# Patient Record
Sex: FEMALE | Race: WHITE | Employment: OTHER | URBAN - METROPOLITAN AREA
[De-identification: names, ages, dates, MRNs, and addresses within clinical notes are randomized per-mention and may not be internally consistent; named-entity substitution may affect disease eponyms.]

---

## 2019-11-04 ENCOUNTER — OFFICE VISIT (OUTPATIENT)
Dept: OTOLARYNGOLOGY | Facility: CLINIC | Age: 79
End: 2019-11-04
Payer: MEDICARE

## 2019-11-04 ENCOUNTER — OFFICE VISIT (OUTPATIENT)
Dept: AUDIOLOGY | Facility: CLINIC | Age: 79
End: 2019-11-04
Payer: MEDICARE

## 2019-11-04 VITALS
OXYGEN SATURATION: 95 % | RESPIRATION RATE: 12 BRPM | DIASTOLIC BLOOD PRESSURE: 79 MMHG | SYSTOLIC BLOOD PRESSURE: 173 MMHG | HEART RATE: 70 BPM

## 2019-11-04 DIAGNOSIS — H90.3 SENSORINEURAL HEARING LOSS (SNHL) OF BOTH EARS: ICD-10-CM

## 2019-11-04 DIAGNOSIS — H93.13 TINNITUS, BILATERAL: ICD-10-CM

## 2019-11-04 DIAGNOSIS — H90.3 SENSORINEURAL HEARING LOSS, BILATERAL: Primary | ICD-10-CM

## 2019-11-04 DIAGNOSIS — H81.11 BENIGN PAROXYSMAL POSITIONAL VERTIGO, RIGHT: Primary | ICD-10-CM

## 2019-11-04 DIAGNOSIS — R42 INTERMITTENT VERTIGO: ICD-10-CM

## 2019-11-04 PROCEDURE — 99207 ZZC NO CHARGE LOS: CPT | Performed by: AUDIOLOGIST

## 2019-11-04 PROCEDURE — 92557 COMPREHENSIVE HEARING TEST: CPT | Performed by: AUDIOLOGIST

## 2019-11-04 PROCEDURE — 99203 OFFICE O/P NEW LOW 30 MIN: CPT | Performed by: OTOLARYNGOLOGY

## 2019-11-04 PROCEDURE — 92567 TYMPANOMETRY: CPT | Performed by: AUDIOLOGIST

## 2019-11-04 NOTE — PATIENT INSTRUCTIONS
Scheduling Information  To schedule your CT/MRI scan, please contact [facility] at [phone] OR [facility] at [phone]    To schedule your Surgery, please contact our Specialty Schedulers at [phone]      ENT Clinic Locations Clinic Hours Telephone Number     Jay Sorto  [address] [place] [address]   Monday:           1:00pm -- 5:00pm    Friday:              8:00am - 12:00pm   To schedule/reschedule an appointment with   Dr. Hoff,   please contact our   Specialty Scheduling Department at:     [phone]       [facility] [facility] [place]  [address] [place]  [place] [address] Tuesday:          8:00am -- 2:00pm         Urgent Care Locations Clinic Hours Telephone Numbers     Jay Golden  [address] [place] [address]     Monday-Friday:     11:00am - 9:00pm    Saturday-Sunday:  9:00am - 5:00pm   [phone]     [facility]  [address] [place] [address]     Monday-Friday:      5:00pm - 9:00pm     Saturday-Sunday:  9:00am - 5:00pm   [phone]

## 2019-11-04 NOTE — PROGRESS NOTES
AUDIOLOGY REPORT:    Patient was referred to [facility] from ENT by Dr. Hoff for a hearing examination. Patient reports near chronic episodes of true vertigo for the past 4 years. Patient has been under the care of the [facility]. 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 Patient received hearing aids from [employer] earlier this year for her hearing loss and tinnitus. Patient was unaccompanied to today's visit.       Testing:    Otoscopy:   Otoscopic exam indicates ears are clear of cerumen bilaterally     Tympanograms:    RIGHT: Hypercompliant      LEFT:   negative pressure     Reflexes (reported by stimulus ear): 1000 Hz   Could not maintain adequate seal to obtain reflexes.     Thresholds:   Pure Tone Thresholds assessed using conventional audiometry with good  reliability from 250-8000 Hz bilaterally using insert earphones     RIGHT:  moderate and moderate-severe sensorineural hearing loss    LEFT:    moderate and moderate-severe sensorineural hearing loss    Speech Reception Threshold:    RIGHT: 50 dB HL    LEFT:   55 dB HL    Word Recognition Score:     RIGHT: 80% at 85 dB HL using NU-6 recorded word list.    LEFT:   100% at 85 dB HL using NU-6 recorded word list.    Discussed results with the patient.     Patient was returned to ENT for follow up.     Hernandez Villavicencio CCC-A  Licensed Audiologist  11/4/2019

## 2019-11-04 NOTE — LETTER
11/4/2019         RE: Rupal Luu  [address]  [facility] [address]        Dear Colleague,    Thank you for referring your patient, Rupal Luu, to the [facility]. Please see a copy of my visit note below.    History of Present Illness - Rupal Luu is a 78 year old female here to see me for a long history of dizziness.    She tells me that it started about 4 years ago.  She bought a My Pillow.  The first night she used it, it hurt her neck and she was not even able to get out of bed without spinning vertigo and vomiting.  She tells me that since then every time she turns over in bed to the LEFT she gets spinning vertigo.  Also, when she looks up or bends over it also makes her dizzy. The dizzy spells only last for a minute or less, but she throws up from these dizzy spells.    She also notes that she has had profound RIGHT tinnitus for many years.  Sometimes so loud that she can barely sleep.    She has not seen an ENT before, but went to Balance Therapy with [facility] in [place] and they did canalith positioning maneuvers.  It worked for a day or so, but always comes back.    No history of any ear disease prior to this, other than a history of motion sickness.    Past Medical History - There is no problem list on file for this patient.      Current Medications - No current outpatient medications on file.    Allergies - No Known Allergies    Social History -   Social History     Socioeconomic History     Marital status: Single     Spouse name: Not on file     Number of children: Not on file     Years of education: Not on file     Highest education level: Not on file   Occupational History     Not on file   Social Needs     Financial resource strain: Not on file     Food insecurity:     Worry: Not on file     Inability: Not on file     Transportation needs:     Medical: Not on file     Non-medical: Not on file   Tobacco Use     Smoking status: Not on file   Substance and Sexual Activity      Alcohol use: Not on file     Drug use: Not on file     Sexual activity: Not on file   Lifestyle     Physical activity:     Days per week: Not on file     Minutes per session: Not on file     Stress: Not on file   Relationships     Social connections:     Talks on phone: Not on file     Gets together: Not on file     Attends Church service: Not on file     Active member of club or organization: Not on file     Attends meetings of clubs or organizations: Not on file     Relationship status: Not on file     Intimate partner violence:     Fear of current or ex partner: Not on file     Emotionally abused: Not on file     Physically abused: Not on file     Forced sexual activity: Not on file   Other Topics Concern     Not on file   Social History Narrative     Not on file       Family History - No family history on file.    Review of Systems - As per HPI and PMHx, otherwise 10+ system review of the head and neck, and general constitution is negative.    Physical Exam  BP (!) 173/79   Pulse 70   Resp 12   SpO2 95%     General - The patient is well nourished and well developed, and appears to have good nutritional status.  Alert and oriented to person and place, answers questions and cooperates with examination appropriately.   Head and Face - Normocephalic and atraumatic, with no gross asymmetry noted of the contour of the facial features.  The facial nerve is intact, with strong symmetric movements.  Voice and Breathing - The patient was breathing comfortably without the use of accessory muscles. There was no wheezing, stridor, or stertor.  The patients voice was clear and strong, and had appropriate pitch and quality.  Ears - The tympanic membranes are normal in appearance, bony landmarks are intact.  No retraction, perforation, or masses.  No fluid or purulence was seen in the external canal or the middle ear. No evidence of infection of the middle ear or external canal, cerumen was normal in appearance.  Eyes -  Extraocular movements intact, and the pupils were reactive to light.  Sclera were not icteric or injected, conjunctiva were pink and moist.  Mouth - Examination of the oral cavity showed pink, healthy oral mucosa. No lesions or ulcerations noted.  The tongue was mobile and midline, and the dentition were in good condition.    Throat - The walls of the oropharynx were smooth, pink, moist, symmetric, and had no lesions or ulcerations.  The tonsillar pillars and soft palate were symmetric.  The uvula was midline on elevation.    Neck - Normal midline excursion of the laryngotracheal complex during swallowing.  Full range of motion on passive movement.  Palpation of the occipital, submental, submandibular, internal jugular chain, and supraclavicular nodes did not demonstrate any abnormal lymph nodes or masses.  The carotid pulse was palpable bilaterally.  Palpation of the thyroid was soft and smooth, with no nodules or goiter appreciated.  The trachea was mobile and midline.  Nose - External contour is symmetric, no gross deflection or scars.  Nasal mucosa is pink and moist with no abnormal mucus.  The septum was midline and non-obstructive, turbinates of normal size and position.  No polyps, masses, or purulence noted on examination.    Audiologic Studies - An audiogram and tympanogram were performed today as part of the evaluation and personally reviewed. The tympanogram shows a normal Type A curve, with normal canal volume and middle ear pressure.  There is no sign of eustachian tube dysfunction or middle ear effusion.  The audiogram shows a mostly symmetric moderate to severe sensorineural hearing loss bilaterally, but a mild low frequency threshold shift in the RIGHT ear.      A/P - Rupal Luu is a 78 year old female  (H81.11) Benign paroxysmal positional vertigo, right  (primary encounter diagnosis)    This is a very unusual situation.  Her symptoms are definitely suggestive of intractable benign paroxysmal  positional vertigo, but four consecutive years is quite exceptional.  It might be possible that there is an overlay of RIGHT Meniere's, and thus the unilateral tinnitus and slight asymmetric sensorineural hearing loss in the low frequencies.    I have discussed a wide range of options, including further balance testing, or referral to the university, possibly even for obliteration.  However, it sounds like her experiences with [facility] have made her averse to further canalith maneuvers or testing.    I am going to schedule her for a high concentration RIGHT steroid test transtympanic injection, and if it works we will do a series of 6 ,once per month.  I have made it clear that I don't have high anticipation for success, but her quality of life is so severely impacted, I think it worth trying.    Again, thank you for allowing me to participate in the care of your patient.        Sincerely,        Frandy Hoff MD

## 2019-11-04 NOTE — PROGRESS NOTES
History of Present Illness - Rupal Luu is a 78 year old female here to see me for a long history of dizziness.    She tells me that it started about 4 years ago.  She bought a My Pillow.  The first night she used it, it hurt her neck and she was not even able to get out of bed without spinning vertigo and vomiting.  She tells me that since then every time she turns over in bed to the LEFT she gets spinning vertigo.  Also, when she looks up or bends over it also makes her dizzy. The dizzy spells only last for a minute or less, but she throws up from these dizzy spells.    She also notes that she has had profound RIGHT tinnitus for many years.  Sometimes so loud that she can barely sleep.    She has not seen an ENT before, but went to Balance Therapy with [facility] in [place] and they did canalith positioning maneuvers.  It worked for a day or so, but always comes back.    No history of any ear disease prior to this, other than a history of motion sickness.    Past Medical History - There is no problem list on file for this patient.      Current Medications - No current outpatient medications on file.    Allergies - No Known Allergies    Social History -   Social History     Socioeconomic History     Marital status: Single     Spouse name: Not on file     Number of children: Not on file     Years of education: Not on file     Highest education level: Not on file   Occupational History     Not on file   Social Needs     Financial resource strain: Not on file     Food insecurity:     Worry: Not on file     Inability: Not on file     Transportation needs:     Medical: Not on file     Non-medical: Not on file   Tobacco Use     Smoking status: Not on file   Substance and Sexual Activity     Alcohol use: Not on file     Drug use: Not on file     Sexual activity: Not on file   Lifestyle     Physical activity:     Days per week: Not on file     Minutes per session: Not on file     Stress: Not on file   Relationships     Social  connections:     Talks on phone: Not on file     Gets together: Not on file     Attends [employer] service: Not on file     Active member of club or organization: Not on file     Attends meetings of clubs or organizations: Not on file     Relationship status: Not on file     Intimate partner violence:     Fear of current or ex partner: Not on file     Emotionally abused: Not on file     Physically abused: Not on file     Forced sexual activity: Not on file   Other Topics Concern     Not on file   Social History Narrative     Not on file       Family History - No family history on file.    Review of Systems - As per HPI and PMHx, otherwise 10+ system review of the head and neck, and general constitution is negative.    Physical Exam  BP (!) 173/79   Pulse 70   Resp 12   SpO2 95%     General - The patient is well nourished and well developed, and appears to have good nutritional status.  Alert and oriented to person and place, answers questions and cooperates with examination appropriately.   Head and Face - Normocephalic and atraumatic, with no gross asymmetry noted of the contour of the facial features.  The facial nerve is intact, with strong symmetric movements.  Voice and Breathing - The patient was breathing comfortably without the use of accessory muscles. There was no wheezing, stridor, or stertor.  The patients voice was clear and strong, and had appropriate pitch and quality.  Ears - The tympanic membranes are normal in appearance, bony landmarks are intact.  No retraction, perforation, or masses.  No fluid or purulence was seen in the external canal or the middle ear. No evidence of infection of the middle ear or external canal, cerumen was normal in appearance.  Eyes - Extraocular movements intact, and the pupils were reactive to light.  Sclera were not icteric or injected, conjunctiva were pink and moist.  Mouth - Examination of the oral cavity showed pink, healthy oral mucosa. No lesions or ulcerations  noted.  The tongue was mobile and midline, and the dentition were in good condition.    Throat - The walls of the oropharynx were smooth, pink, moist, symmetric, and had no lesions or ulcerations.  The tonsillar pillars and soft palate were symmetric.  The uvula was midline on elevation.    Neck - Normal midline excursion of the laryngotracheal complex during swallowing.  Full range of motion on passive movement.  Palpation of the occipital, submental, submandibular, internal jugular chain, and supraclavicular nodes did not demonstrate any abnormal lymph nodes or masses.  The carotid pulse was palpable bilaterally.  Palpation of the thyroid was soft and smooth, with no nodules or goiter appreciated.  The trachea was mobile and midline.  Nose - External contour is symmetric, no gross deflection or scars.  Nasal mucosa is pink and moist with no abnormal mucus.  The septum was midline and non-obstructive, turbinates of normal size and position.  No polyps, masses, or purulence noted on examination.    Audiologic Studies - An audiogram and tympanogram were performed today as part of the evaluation and personally reviewed. The tympanogram shows a normal Type A curve, with normal canal volume and middle ear pressure.  There is no sign of eustachian tube dysfunction or middle ear effusion.  The audiogram shows a mostly symmetric moderate to severe sensorineural hearing loss bilaterally, but a mild low frequency threshold shift in the RIGHT ear.      A/P - Rupal Luu is a 78 year old female  (H81.11) Benign paroxysmal positional vertigo, right  (primary encounter diagnosis)    This is a very unusual situation.  Her symptoms are definitely suggestive of intractable benign paroxysmal positional vertigo, but four consecutive years is quite exceptional.  It might be possible that there is an overlay of RIGHT Meniere's, and thus the unilateral tinnitus and slight asymmetric sensorineural hearing loss in the low  frequencies.    I have discussed a wide range of options, including further balance testing, or referral to the university, possibly even for obliteration.  However, it sounds like her experiences with [facility] have made her averse to further canalith maneuvers or testing.    I am going to schedule her for a high concentration RIGHT steroid test transtympanic injection, and if it works we will do a series of 6 ,once per month.  I have made it clear that I don't have high anticipation for success, but her quality of life is so severely impacted, I think it worth trying.

## 2020-10-16 NOTE — PROGRESS NOTES
History of Present Illness - Rupal Luu is a 78 year old female here to see me for a long history of dizziness, last seen on 11/4/2019.    To review, this started in 2015.  She bought a My Pillow.  The first night she used it, it hurt her neck and she was not even able to get out of bed without spinning vertigo and vomiting.  She tells me that since then every time she turns over in bed to the LEFT she gets spinning vertigo.  Also, when she looks up or bends over it also makes her dizzy. The dizzy spells only last for a minute or less, but she throws up from these dizzy spells. She also notes that she has had profound RIGHT tinnitus for many years.  Sometimes so loud that she can barely sleep.    She has not seen an ENT before, but went to Balance Therapy with [facility] in [place] and they did canalith positioning maneuvers.  It worked for a day or so, but always comes back.    No history of any ear disease prior to this, other than a history of motion sickness. The audiogram at the 11/4/2019 visit showed a mostly symmetric moderate to severe sensorineural hearing loss bilaterally, but a mild low frequency threshold shift in the RIGHT ear.    After the exam, it was not clear if this was benign paroxysmal positional vertigo vs RIGHT Meniere's.  I discussed options, including a test injection of high dose transtympanic steroids. Things got better spontaneously and she was lost to follow up until now.  She did not follow up because she was hospitalized with viral pneumonia.    She has returned to me because after a recent MRA she was found to have some RIGHT carotid stenosis and follow up US.  She was seen just last week by vascular surgery, and it was suggested to her that it might be related to her dizziness epsiodes.  The vascular surgeon and her cardiologist recommended not pursuing a CEA at this point, and to just do a 6 month follow up US.    A new audiogram was done today and compared to her previous hearing test  from November 2019.  The previously noted RIGHT low frequency 10-15dB threshold shift has resolved.    Past Medical History -   Patient Active Problem List   Diagnosis     Benign paroxysmal positional vertigo, right       Current Medications - No current outpatient medications on file.    Allergies - No Known Allergies    Social History -   Social History     Socioeconomic History     Marital status: Single     Spouse name: Not on file     Number of children: Not on file     Years of education: Not on file     Highest education level: Not on file   Occupational History     Not on file   Social Needs     Financial resource strain: Not on file     Food insecurity:     Worry: Not on file     Inability: Not on file     Transportation needs:     Medical: Not on file     Non-medical: Not on file   Tobacco Use     Smoking status: Not on file   Substance and Sexual Activity     Alcohol use: Not on file     Drug use: Not on file     Sexual activity: Not on file   Lifestyle     Physical activity:     Days per week: Not on file     Minutes per session: Not on file     Stress: Not on file   Relationships     Social connections:     Talks on phone: Not on file     Gets together: Not on file     Attends Ep[employer] service: Not on file     Active member of club or organization: Not on file     Attends meetings of clubs or organizations: Not on file     Relationship status: Not on file     Intimate partner violence:     Fear of current or ex partner: Not on file     Emotionally abused: Not on file     Physically abused: Not on file     Forced sexual activity: Not on file   Other Topics Concern     Not on file   Social History Narrative     Not on file       Family History - No family history on file.    Review of Systems - As per HPI and PMHx, otherwise 10+ system review of the head and neck, and general constitution is negative.    Physical Exam  BP (!) 154/71   Pulse 76   Resp 16   Wt 89.4 kg (197 lb)   SpO2 97%     General -  The patient is well nourished and well developed, and appears to have good nutritional status.  Alert and oriented to person and place, answers questions and cooperates with examination appropriately.   Head and Face - Normocephalic and atraumatic, with no gross asymmetry noted of the contour of the facial features.  The facial nerve is intact, with strong symmetric movements.  Voice and Breathing - The patient was breathing comfortably without the use of accessory muscles. There was no wheezing, stridor, or stertor.  The patients voice was clear and strong, and had appropriate pitch and quality.  Ears - The tympanic membranes are normal in appearance, bony landmarks are intact.  No retraction, perforation, or masses.  No fluid or purulence was seen in the external canal or the middle ear. No evidence of infection of the middle ear or external canal, cerumen was normal in appearance.  Eyes - Extraocular movements intact, and the pupils were reactive to light.  Sclera were not icteric or injected, conjunctiva were pink and moist.  Mouth - Examination of the oral cavity showed pink, healthy oral mucosa. No lesions or ulcerations noted.  The tongue was mobile and midline, and the dentition were in good condition.    Throat - The walls of the oropharynx were smooth, pink, moist, symmetric, and had no lesions or ulcerations.  The tonsillar pillars and soft palate were symmetric.  The uvula was midline on elevation.      PROCEDURE -     After discussion and consent, we proceeded with the transtympanic steroid injection.    The patient was positioned semi-reclined in the exam chair, and the RIGHT  ear canal was cleared of cerumen.  Once a clear view of the tympanic membrane was achieved, I used a small applicator of phenol to anesthetize the tympanic membrane, half way between the tip of the umbo and the annulus at 6 o'clock.  I then used a 25 gauge spinal needle and injected 1cc of 2.4% Dexamethasone into the middle ear.   The patient was then position on their side with the injected ear up for 10 minutes.    The patient tolerated it well.      A/P - Rupal Luu is a 78 year old female  (R42) Dizziness  (primary encounter diagnosis)  (H90.5) SNHL (sensory-neural hearing loss), asymmetrical    The patient has had a RIGHT transtympanic steroid injection today.  I will see her back in one month for repeat exam and possible re-injection if there is improvement in her dizziness and tinnitus.  I would plan for a series of a total of 6 injections.

## 2020-10-19 ENCOUNTER — OFFICE VISIT (OUTPATIENT)
Dept: OTOLARYNGOLOGY | Facility: CLINIC | Age: 80
End: 2020-10-19
Payer: MEDICARE

## 2020-10-19 ENCOUNTER — OFFICE VISIT (OUTPATIENT)
Dept: AUDIOLOGY | Facility: CLINIC | Age: 80
End: 2020-10-19
Payer: MEDICARE

## 2020-10-19 VITALS
SYSTOLIC BLOOD PRESSURE: 154 MMHG | WEIGHT: 197 LBS | OXYGEN SATURATION: 97 % | RESPIRATION RATE: 16 BRPM | HEART RATE: 76 BPM | DIASTOLIC BLOOD PRESSURE: 71 MMHG

## 2020-10-19 DIAGNOSIS — H90.3 SNHL (SENSORY-NEURAL HEARING LOSS), ASYMMETRICAL: ICD-10-CM

## 2020-10-19 DIAGNOSIS — R42 DIZZINESS: Primary | ICD-10-CM

## 2020-10-19 DIAGNOSIS — H90.3 SENSORINEURAL HEARING LOSS, BILATERAL: Primary | ICD-10-CM

## 2020-10-19 PROBLEM — D50.8 OTHER IRON DEFICIENCY ANEMIA: Status: ACTIVE | Noted: 2020-10-08

## 2020-10-19 PROBLEM — R19.7 BLOODY DIARRHEA: Status: ACTIVE | Noted: 2019-11-17

## 2020-10-19 PROBLEM — I77.1 SUBCLAVIAN ARTERIAL STENOSIS (H): Status: ACTIVE | Noted: 2019-10-16

## 2020-10-19 PROBLEM — E11.9 TYPE 2 DIABETES MELLITUS WITHOUT COMPLICATION (H): Status: ACTIVE | Noted: 2020-09-16

## 2020-10-19 PROBLEM — R09.89 CAROTID BRUIT: Status: ACTIVE | Noted: 2020-09-16

## 2020-10-19 PROBLEM — J20.9 ACUTE BRONCHITIS: Status: ACTIVE | Noted: 2019-11-16

## 2020-10-19 PROBLEM — N25.81 SECONDARY HYPERPARATHYROIDISM OF RENAL ORIGIN (H): Status: ACTIVE | Noted: 2019-05-22

## 2020-10-19 PROBLEM — R80.1 PERSISTENT PROTEINURIA: Status: ACTIVE | Noted: 2020-10-08

## 2020-10-19 PROBLEM — M10.00 IDIOPATHIC GOUT: Status: ACTIVE | Noted: 2020-10-08

## 2020-10-19 PROCEDURE — 92557 COMPREHENSIVE HEARING TEST: CPT | Performed by: AUDIOLOGIST

## 2020-10-19 PROCEDURE — 99214 OFFICE O/P EST MOD 30 MIN: CPT | Mod: 25 | Performed by: OTOLARYNGOLOGY

## 2020-10-19 PROCEDURE — 99207 PR NO CHARGE LOS: CPT | Performed by: AUDIOLOGIST

## 2020-10-19 PROCEDURE — 69799 UNLISTED PX MIDDLE EAR: CPT | Performed by: OTOLARYNGOLOGY

## 2020-10-19 PROCEDURE — 92550 TYMPANOMETRY & REFLEX THRESH: CPT | Performed by: AUDIOLOGIST

## 2020-10-19 RX ORDER — ESOMEPRAZOLE MAGNESIUM 40 MG/1
40 CAPSULE, DELAYED RELEASE ORAL
COMMUNITY
Start: 2020-09-30

## 2020-10-19 RX ORDER — ROSUVASTATIN CALCIUM 10 MG/1
10 TABLET, COATED ORAL
COMMUNITY
Start: 2020-09-24

## 2020-10-19 RX ORDER — LEVOTHYROXINE SODIUM 50 UG/1
TABLET ORAL
COMMUNITY
Start: 2020-09-30

## 2020-10-19 RX ORDER — FEXOFENADINE HCL 180 MG/1
180 TABLET ORAL
COMMUNITY
Start: 2020-09-30

## 2020-10-19 RX ORDER — DOXYCYCLINE HYCLATE 100 MG
TABLET ORAL
COMMUNITY
Start: 2020-02-25

## 2020-10-19 RX ORDER — TELMISARTAN 40 MG/1
40 TABLET ORAL
COMMUNITY
Start: 2020-01-27

## 2020-10-19 RX ORDER — PREDNISONE 10 MG/1
TABLET ORAL
COMMUNITY
Start: 2019-11-25

## 2020-10-19 RX ORDER — FAMOTIDINE 40 MG/1
40 TABLET, FILM COATED ORAL
COMMUNITY
Start: 2020-09-30

## 2020-10-19 RX ORDER — METOPROLOL SUCCINATE 25 MG/1
25 TABLET, EXTENDED RELEASE ORAL
COMMUNITY
Start: 2019-10-23

## 2020-10-19 RX ORDER — CLOPIDOGREL BISULFATE 75 MG/1
75 TABLET ORAL
COMMUNITY
Start: 2020-07-27

## 2020-10-19 RX ORDER — ROSUVASTATIN CALCIUM 5 MG/1
TABLET, COATED ORAL
COMMUNITY
Start: 2020-08-28

## 2020-10-19 RX ORDER — HYDROXYZINE HYDROCHLORIDE 10 MG/1
TABLET, FILM COATED ORAL
COMMUNITY
Start: 2019-12-17

## 2020-10-19 RX ORDER — BENZONATATE 100 MG/1
CAPSULE ORAL
COMMUNITY
Start: 2020-02-05

## 2020-10-19 RX ORDER — BLOOD-GLUCOSE METER
EACH MISCELLANEOUS
COMMUNITY
Start: 2020-09-18

## 2020-10-19 RX ORDER — VALACYCLOVIR HYDROCHLORIDE 500 MG/1
TABLET, FILM COATED ORAL
COMMUNITY
Start: 2019-11-10

## 2020-10-19 RX ORDER — INSULIN PUMP SYRINGE, 3 ML
EACH MISCELLANEOUS
COMMUNITY
Start: 2020-09-17

## 2020-10-19 RX ORDER — ESOMEPRAZOLE MAGNESIUM 40 MG/1
CAPSULE, DELAYED RELEASE ORAL
COMMUNITY
Start: 2020-09-30

## 2020-10-19 RX ORDER — NITROGLYCERIN 0.4 MG/1
1 TABLET SUBLINGUAL
COMMUNITY

## 2020-10-19 RX ORDER — ZOLPIDEM TARTRATE 10 MG/1
TABLET ORAL
COMMUNITY
Start: 2020-09-21

## 2020-10-19 RX ORDER — A/SINGAPORE/GP1908/2015 IVR-180 (AN A/MICHIGAN/45/2015 (H1N1)PDM09-LIKE VIRUS, A/HONG KONG/4801/2014, NYMC X-263B (H3N2) (AN A/HONG KONG/4801/2014-LIKE VIRUS), AND B/BRISBANE/60/2008, WILD TYPE (A B/BRISBANE/60/2008-LIKE VIRUS) 15; 15; 15 UG/.5ML; UG/.5ML; UG/.5ML
INJECTION, SUSPENSION INTRAMUSCULAR
COMMUNITY
Start: 2020-09-04

## 2020-10-19 ASSESSMENT — PAIN SCALES - GENERAL: PAINLEVEL: NO PAIN (0)

## 2020-10-19 NOTE — LETTER
10/19/2020         RE: Rupal Luu  [address]  [facility] [address]        Dear Colleague,    Thank you for referring your patient, Rupal Luu, to the [facility]. Please see a copy of my visit note below.    History of Present Illness - Rupal Luu is a 78 year old female here to see me for a long history of dizziness, last seen on 11/4/2019.    To review, this started in 2015.  She bought a My Pillow.  The first night she used it, it hurt her neck and she was not even able to get out of bed without spinning vertigo and vomiting.  She tells me that since then every time she turns over in bed to the LEFT she gets spinning vertigo.  Also, when she looks up or bends over it also makes her dizzy. The dizzy spells only last for a minute or less, but she throws up from these dizzy spells. She also notes that she has had profound RIGHT tinnitus for many years.  Sometimes so loud that she can barely sleep.    She has not seen an ENT before, but went to [facility] with [facility] in [place] and they did canalith positioning maneuvers.  It worked for a day or so, but always comes back.    No history of any ear disease prior to this, other than a history of motion sickness. The audiogram at the 11/4/2019 visit showed a mostly symmetric moderate to severe sensorineural hearing loss bilaterally, but a mild low frequency threshold shift in the RIGHT ear.    After the exam, it was not clear if this was benign paroxysmal positional vertigo vs RIGHT Meniere's.  I discussed options, including a test injection of high dose transtympanic steroids. Things got better spontaneously and she was lost to follow up until now.  She did not follow up because she was hospitalized with viral pneumonia.    She has returned to me because after a recent MRA she was found to have some RIGHT carotid stenosis and follow up US.  She was seen just last week by vascular surgery, and it was suggested to her that it  might be related to her dizziness epsiodes.  The vascular surgeon and her cardiologist recommended not pursuing a CEA at this point, and to just do a 6 month follow up US.    A new audiogram was done today and compared to her previous hearing test from November 2019.  The previously noted RIGHT low frequency 10-15dB threshold shift has resolved.    Past Medical History -   Patient Active Problem List   Diagnosis     Benign paroxysmal positional vertigo, right       Current Medications - No current outpatient medications on file.    Allergies - No Known Allergies    Social History -   Social History     Socioeconomic History     Marital status: Single     Spouse name: Not on file     Number of children: Not on file     Years of education: Not on file     Highest education level: Not on file   Occupational History     Not on file   Social Needs     Financial resource strain: Not on file     Food insecurity:     Worry: Not on file     Inability: Not on file     Transportation needs:     Medical: Not on file     Non-medical: Not on file   Tobacco Use     Smoking status: Not on file   Substance and Sexual Activity     Alcohol use: Not on file     Drug use: Not on file     Sexual activity: Not on file   Lifestyle     Physical activity:     Days per week: Not on file     Minutes per session: Not on file     Stress: Not on file   Relationships     Social connections:     Talks on phone: Not on file     Gets together: Not on file     Attends Confucianist service: Not on file     Active member of club or organization: Not on file     Attends meetings of clubs or organizations: Not on file     Relationship status: Not on file     Intimate partner violence:     Fear of current or ex partner: Not on file     Emotionally abused: Not on file     Physically abused: Not on file     Forced sexual activity: Not on file   Other Topics Concern     Not on file   Social History Narrative     Not on file       Family History - No family  history on file.    Review of Systems - As per HPI and PMHx, otherwise 10+ system review of the head and neck, and general constitution is negative.    Physical Exam  BP (!) 154/71   Pulse 76   Resp 16   Wt 89.4 kg (197 lb)   SpO2 97%     General - The patient is well nourished and well developed, and appears to have good nutritional status.  Alert and oriented to person and place, answers questions and cooperates with examination appropriately.   Head and Face - Normocephalic and atraumatic, with no gross asymmetry noted of the contour of the facial features.  The facial nerve is intact, with strong symmetric movements.  Voice and Breathing - The patient was breathing comfortably without the use of accessory muscles. There was no wheezing, stridor, or stertor.  The patients voice was clear and strong, and had appropriate pitch and quality.  Ears - The tympanic membranes are normal in appearance, bony landmarks are intact.  No retraction, perforation, or masses.  No fluid or purulence was seen in the external canal or the middle ear. No evidence of infection of the middle ear or external canal, cerumen was normal in appearance.  Eyes - Extraocular movements intact, and the pupils were reactive to light.  Sclera were not icteric or injected, conjunctiva were pink and moist.  Mouth - Examination of the oral cavity showed pink, healthy oral mucosa. No lesions or ulcerations noted.  The tongue was mobile and midline, and the dentition were in good condition.    Throat - The walls of the oropharynx were smooth, pink, moist, symmetric, and had no lesions or ulcerations.  The tonsillar pillars and soft palate were symmetric.  The uvula was midline on elevation.      PROCEDURE -     After discussion and consent, we proceeded with the transtympanic steroid injection.    The patient was positioned semi-reclined in the exam chair, and the RIGHT  ear canal was cleared of cerumen.  Once a clear view of the tympanic membrane  was achieved, I used a small applicator of phenol to anesthetize the tympanic membrane, half way between the tip of the umbo and the annulus at 6 o'clock.  I then used a 25 gauge spinal needle and injected 1cc of 2.4% Dexamethasone into the middle ear.  The patient was then position on their side with the injected ear up for 10 minutes.    The patient tolerated it well.      A/P - Rupal Luu is a 78 year old female  (R42) Dizziness  (primary encounter diagnosis)  (H90.5) SNHL (sensory-neural hearing loss), asymmetrical    The patient has had a RIGHT transtympanic steroid injection today.  I will see her back in one month for repeat exam and possible re-injection if there is improvement in her dizziness and tinnitus.  I would plan for a series of a total of 6 injections.        Again, thank you for allowing me to participate in the care of your patient.        Sincerely,        Frandy Hoff MD

## 2020-10-19 NOTE — PATIENT INSTRUCTIONS
Scheduling Information  To schedule your CT/MRI scan, please contact [facility] at [phone] OR [facility] at [phone]    To schedule your Surgery, please contact our Specialty Schedulers at [phone]      ENT Clinic Locations Clinic Hours Telephone Number     [facility]  [address] [place] [address]   Monday:           1:00pm -- 5:00pm    Friday:              8:00am - 12:00pm   To schedule/reschedule an appointment with   Dr. Hoff,   please contact our   Specialty Scheduling Department at:     [phone]       [facility]  [address] [place] [address] Tuesday:          8:00am -- 2:00pm         Urgent Care Locations Clinic Hours Telephone Numbers     [facility]  [address] [place] [address]     Monday-Friday:     11:00am - 9:00pm    Saturday-Sunday:  9:00am - 5:00pm   [phone]     [facility]  [address] [place] [address]     Monday-Friday:      5:00pm - 9:00pm     Saturday-Sunday:  9:00am - 5:00pm   [phone]

## 2020-11-19 NOTE — PROGRESS NOTES
History of Present Illness - Rupal Luu is a very pleasant 78 year old female here to see me for a long history of dizziness, last seen on 10/19/2020.    To review, this started in 2015.  She bought a My Pillow.  The first night she used it, she tells me that it hurt her neck and she was not even able to get out of bed without spinning vertigo and vomiting.  She tells me that since then every time she turns over in bed to the LEFT she gets spinning vertigo.  Also, when she looks up or bends over it also makes her dizzy. The dizzy spells only last for a minute or less, but she throws up from these dizzy spells. She also notes that she has had profound RIGHT tinnitus for many years.  Sometimes so loud that she can barely sleep.    She had not seen an ENT before, but went to [facility] Therapy with [facility] in [place] and they did canalith positioning maneuvers.  It worked for a day or so, but always comes back.    No history of any ear disease prior to this, other than a history of motion sickness. The audiogram at the 11/4/2019 visit showed a mostly symmetric moderate to severe sensorineural hearing loss bilaterally, but a mild low frequency threshold shift in the RIGHT ear.    After the exam, it was not clear if this was benign paroxysmal positional vertigo vs RIGHT Meniere's, but potentially both.  I discussed options, including a test injection of high dose transtympanic steroids. Things got better spontaneously and she was lost to follow up until October 2020.  She did not follow up because she was hospitalized with a severe viral pneumonia.    She returned to me on 10/19/2020 because after a recent MRA she was found to have some RIGHT carotid stenosis and had a follow up US.  She was seen by vascular surgery, and it was suggested to her that it might be related to her dizziness epsiodes.  The vascular surgeon and her cardiologist recommended not pursuing a CEA at that point, and to just do a 6 month follow up  US.    A new audiogram was done 10/19/2020 and compared to her previous hearing test from November 2019.  The previously noted RIGHT low frequency 10-15dB threshold shift had resolved.    Due to continued severe RIGHT tinnitus, we performed a transtympanic steroid injection on 10/19/2020, and she is here for follow up.  She tells me that it has not helped her tinnitus at all, which is still terribly severe.  Perhaps some improvement in the dizziness episode frequency.    Past Medical History -   Patient Active Problem List   Diagnosis     Benign paroxysmal positional vertigo, right     ACP (advance care planning)     Acute bronchitis     Backache     Bloody diarrhea     Carotid bruit     Coronary atherosclerosis     Elevated fasting glucose     Essential hypertension     Hypothyroidism     Idiopathic gout     Hyperlipidemia     Obesity (BMI 30-39.9)     MICHELLE (obstructive sleep apnea)     Other iron deficiency anemia     Persistent proteinuria     Secondary hyperparathyroidism of renal origin (H)     Stage 4 chronic kidney disease (H)     Subclavian arterial stenosis (H)     Type 2 diabetes mellitus without complication (H)     Vertigo       Current Medications -   Current Outpatient Medications:      benzonatate (TESSALON) 100 MG capsule, TK 1 C PO TID PRF COUGH, Disp: , Rfl:      blood glucose (NO BRAND SPECIFIED) test strip, Dispense test strips covered by pt ins. Test 1 time per day. E11.9 NIIDM type II, Disp: , Rfl:      Blood Glucose Monitoring Suppl (ACCU-CHEK GUIDE) w/Device KIT, TEST ONE TIME D, Disp: , Rfl:      Blood Glucose Monitoring Suppl (FIFTY50 GLUCOSE METER 2.0) w/Device KIT, Dispense meter covered by pt ins. E11.9 NIDDM type II - Test 1 time/day, Disp: , Rfl:      cholecalciferol 50 MCG (2000 UT) tablet, Take 1 tablet by mouth, Disp: , Rfl:      clopidogrel (PLAVIX) 75 MG tablet, Take 75 mg by mouth, Disp: , Rfl:      doxycycline hyclate (VIBRA-TABS) 100 MG tablet, TAKE 1 TABLET BY MOUTH TWICE DAILY  FOR 7 DAYS, Disp: , Rfl:      esomeprazole (NEXIUM) 40 MG DR capsule, Take 40 mg by mouth, Disp: , Rfl:      esomeprazole (NEXIUM) 40 MG DR capsule, TAKE 1 CAPSULE BY MOUTH ONCE DAILY BEFORE A MEAL, Disp: , Rfl:      famotidine (PEPCID) 40 MG tablet, Take 40 mg by mouth, Disp: , Rfl:      fexofenadine (ALLEGRA) 180 MG tablet, Take 180 mg by mouth, Disp: , Rfl:      FLUAD QUADRIVALENT 0.5 ML PRSY injection, PHARMACIST ADMINISTERED IMMUNIZATION ADMINISTERED AT TIME OF DISPENSING, Disp: , Rfl:      hydrOXYzine (ATARAX) 10 MG tablet, TAKE 1-2 TABLETS BY MOUTH EVERY 8 HOURS AS NEEDED, Disp: , Rfl:      levothyroxine (SYNTHROID/LEVOTHROID) 50 MCG tablet, TAKE 1 TABLET BY MOUTH DAILY BEFORE BREAKFAST ON AN EMPTY STOMACH FOR THYROID REPLACEMENT, Disp: , Rfl:      metoprolol succinate ER (TOPROL-XL) 25 MG 24 hr tablet, Take 25 mg by mouth, Disp: , Rfl:      nitroGLYcerin (NITROSTAT) 0.4 MG sublingual tablet, Place 1 tablet under the tongue, Disp: , Rfl:      predniSONE (DELTASONE) 10 MG tablet, , Disp: , Rfl:      rosuvastatin (CRESTOR) 10 MG tablet, Take 10 mg by mouth, Disp: , Rfl:      rosuvastatin (CRESTOR) 5 MG tablet, TAKE 1 TABLET BY MOUTH EVERY OTHER DAY IN THE EVENING, Disp: , Rfl:      telmisartan (MICARDIS) 40 MG tablet, Take 40 mg by mouth, Disp: , Rfl:      valACYclovir (VALTREX) 500 MG tablet, TK 4 TS PO AT ONSET OF COLDSORE AND 4 TS 12 HOURS LATER, Disp: , Rfl:      zolpidem (AMBIEN) 10 MG tablet, TAKE 1/2 TABLET BY MOUTH AT BEDTIME AS NEEDED FOR SLEEP, Disp: , Rfl:     Allergies -   Allergies   Allergen Reactions     Prednisone Anaphylaxis, Anxiety and Other (See Comments)     Atorvastatin Nausea and Vomiting and Other (See Comments)     Carbamazepine Unknown and Other (See Comments)     Sore throat and tongue felt like boiling water poured over it  Sore throat and tongue felt like boiling water poured over it       Chlorpheniramine & Phenylpropanolamine Injection  [A.R.M.]      Dizzy, nausea     Codeine  Nausea and Vomiting and Other (See Comments)     Contrast Dye Unknown     Contraindicated d/t advanced kidney diesease     Duloxetine Dizziness and Other (See Comments)     Furosemide Itching     Gabapentin Swelling and Other (See Comments)     Hydrocodone-Acetaminophen Nausea and Vomiting and Other (See Comments)     Ibrutinib Nausea     Ibuprofen Unknown and Other (See Comments)     Losartan Other (See Comments)     Meperidine Nausea and Vomiting and Other (See Comments)     Neomycin-Polymyxin-Dexameth Itching     Landen/Poly/Dex 0.1% opth. Ointment  Extreme itching inside eye     Simvastatin Muscle Pain (Myalgia) and Other (See Comments)     Sulfa Drugs Other (See Comments)     Tramadol Unknown and Other (See Comments)       Social History -   Social History     Socioeconomic History     Marital status: Single     Spouse name: Not on file     Number of children: Not on file     Years of education: Not on file     Highest education level: Not on file   Occupational History     Not on file   Social Needs     Financial resource strain: Not on file     Food insecurity:     Worry: Not on file     Inability: Not on file     Transportation needs:     Medical: Not on file     Non-medical: Not on file   Tobacco Use     Smoking status: Not on file   Substance and Sexual Activity     Alcohol use: Not on file     Drug use: Not on file     Sexual activity: Not on file   Lifestyle     Physical activity:     Days per week: Not on file     Minutes per session: Not on file     Stress: Not on file   Relationships     Social connections:     Talks on phone: Not on file     Gets together: Not on file     Attends Buddhism service: Not on file     Active member of club or organization: Not on file     Attends meetings of clubs or organizations: Not on file     Relationship status: Not on file     Intimate partner violence:     Fear of current or ex partner: Not on file     Emotionally abused: Not on file     Physically abused: Not on  file     Forced sexual activity: Not on file   Other Topics Concern     Not on file   Social History Narrative     Not on file       Family History - No family history on file.    Review of Systems - As per HPI and PMHx, otherwise 10+ system review of the head and neck, and general constitution is negative.    Physical Exam  /67   Pulse 72   Resp 16   Wt 89.4 kg (197 lb)   SpO2 96%       General - The patient is well nourished and well developed, and appears to have good nutritional status.  Alert and oriented to person and place, answers questions and cooperates with examination appropriately.   Head and Face - Normocephalic and atraumatic, with no gross asymmetry noted of the contour of the facial features.  The facial nerve is intact, with strong symmetric movements.  Voice and Breathing - The patient was breathing comfortably without the use of accessory muscles. There was no wheezing, stridor, or stertor.  The patients voice was clear and strong, and had appropriate pitch and quality.  Ears - The tympanic membranes are normal in appearance, bony landmarks are intact.  No retraction, perforation, or masses.  No fluid or purulence was seen in the external canal or the middle ear. No evidence of infection of the middle ear or external canal, cerumen was normal in appearance.        A/P - Rupal Luu is a 79 year old female  (H90.5) SNHL (sensory-neural hearing loss), asymmetrical  (primary encounter diagnosis)  (R42) Dizziness  (H93.11) Tinnitus, right    I am going to hold on the transtympanic injections.    I am going to treat her bad taste in the mouth with a week of Nystatin.    For her tinnitus I think it's time to refer to Tinnitus program.  I discussed this with her, but because she has so much going on with her esophagus and GI, and Cardiology, she does not want to start another course of medical treatment.  Therefore, I will try to help her with a very low dose at bedtime Elavil.

## 2020-11-23 ENCOUNTER — TELEPHONE (OUTPATIENT)
Dept: OTOLARYNGOLOGY | Facility: CLINIC | Age: 80
End: 2020-11-23

## 2020-11-23 ENCOUNTER — OFFICE VISIT (OUTPATIENT)
Dept: OTOLARYNGOLOGY | Facility: CLINIC | Age: 80
End: 2020-11-23
Payer: MEDICARE

## 2020-11-23 VITALS
HEART RATE: 72 BPM | RESPIRATION RATE: 16 BRPM | OXYGEN SATURATION: 96 % | SYSTOLIC BLOOD PRESSURE: 137 MMHG | WEIGHT: 197 LBS | DIASTOLIC BLOOD PRESSURE: 67 MMHG

## 2020-11-23 DIAGNOSIS — H90.3 SNHL (SENSORY-NEURAL HEARING LOSS), ASYMMETRICAL: Primary | ICD-10-CM

## 2020-11-23 DIAGNOSIS — R42 DIZZINESS: ICD-10-CM

## 2020-11-23 DIAGNOSIS — H93.11 TINNITUS, RIGHT: ICD-10-CM

## 2020-11-23 DIAGNOSIS — B37.0 THRUSH: ICD-10-CM

## 2020-11-23 PROCEDURE — 99214 OFFICE O/P EST MOD 30 MIN: CPT | Performed by: OTOLARYNGOLOGY

## 2020-11-23 RX ORDER — AMITRIPTYLINE HYDROCHLORIDE 10 MG/1
10 TABLET ORAL AT BEDTIME
Qty: 30 TABLET | Refills: 4 | Status: SHIPPED | OUTPATIENT
Start: 2020-11-23

## 2020-11-23 RX ORDER — NYSTATIN 100000/ML
500000 SUSPENSION, ORAL (FINAL DOSE FORM) ORAL 4 TIMES DAILY
Qty: 140 ML | Refills: 1 | Status: SHIPPED | OUTPATIENT
Start: 2020-11-23 | End: 2020-11-30

## 2020-11-23 ASSESSMENT — PAIN SCALES - GENERAL: PAINLEVEL: NO PAIN (0)

## 2020-11-23 NOTE — LETTER
11/23/2020         RE: Rupal Luu  [address]  [facility] [address]        Dear Colleague,    Thank you for referring your patient, Rupal Luu, to the [facility]. Please see a copy of my visit note below.    History of Present Illness - Rupal Luu is a very pleasant 78 year old female here to see me for a long history of dizziness, last seen on 10/19/2020.    To review, this started in 2015.  She bought a My Pillow.  The first night she used it, she tells me that it hurt her neck and she was not even able to get out of bed without spinning vertigo and vomiting.  She tells me that since then every time she turns over in bed to the LEFT she gets spinning vertigo.  Also, when she looks up or bends over it also makes her dizzy. The dizzy spells only last for a minute or less, but she throws up from these dizzy spells. She also notes that she has had profound RIGHT tinnitus for many years.  Sometimes so loud that she can barely sleep.    She had not seen an ENT before, but went to [facility] with [facility] in [place] and they did canalith positioning maneuvers.  It worked for a day or so, but always comes back.    No history of any ear disease prior to this, other than a history of motion sickness. The audiogram at the 11/4/2019 visit showed a mostly symmetric moderate to severe sensorineural hearing loss bilaterally, but a mild low frequency threshold shift in the RIGHT ear.    After the exam, it was not clear if this was benign paroxysmal positional vertigo vs RIGHT Meniere's, but potentially both.  I discussed options, including a test injection of high dose transtympanic steroids. Things got better spontaneously and she was lost to follow up until October 2020.  She did not follow up because she was hospitalized with a severe viral pneumonia.    She returned to me on 10/19/2020 because after a recent MRA she was found to have some RIGHT carotid stenosis and had a follow up  US.  She was seen by vascular surgery, and it was suggested to her that it might be related to her dizziness epsiodes.  The vascular surgeon and her cardiologist recommended not pursuing a CEA at that point, and to just do a 6 month follow up US.    A new audiogram was done 10/19/2020 and compared to her previous hearing test from November 2019.  The previously noted RIGHT low frequency 10-15dB threshold shift had resolved.    Due to continued severe RIGHT tinnitus, we performed a transtympanic steroid injection on 10/19/2020, and she is here for follow up.  She tells me that it has not helped her tinnitus at all, which is still terribly severe.  Perhaps some improvement in the dizziness episode frequency.    Past Medical History -   Patient Active Problem List   Diagnosis     Benign paroxysmal positional vertigo, right     ACP (advance care planning)     Acute bronchitis     Backache     Bloody diarrhea     Carotid bruit     Coronary atherosclerosis     Elevated fasting glucose     Essential hypertension     Hypothyroidism     Idiopathic gout     Hyperlipidemia     Obesity (BMI 30-39.9)     MICHELLE (obstructive sleep apnea)     Other iron deficiency anemia     Persistent proteinuria     Secondary hyperparathyroidism of renal origin (H)     Stage 4 chronic kidney disease (H)     Subclavian arterial stenosis (H)     Type 2 diabetes mellitus without complication (H)     Vertigo       Current Medications -   Current Outpatient Medications:      benzonatate (TESSALON) 100 MG capsule, TK 1 C PO TID PRF COUGH, Disp: , Rfl:      blood glucose (NO BRAND SPECIFIED) test strip, Dispense test strips covered by pt ins. Test 1 time per day. E11.9 NIIDM type II, Disp: , Rfl:      Blood Glucose Monitoring Suppl (ACCU-CHEK GUIDE) w/Device KIT, TEST ONE TIME D, Disp: , Rfl:      Blood Glucose Monitoring Suppl (FIFTY50 GLUCOSE METER 2.0) w/Device KIT, Dispense meter covered by pt ins. E11.9 NIDDM type II - Test 1 time/day, Disp: , Rfl:       cholecalciferol 50 MCG (2000 UT) tablet, Take 1 tablet by mouth, Disp: , Rfl:      clopidogrel (PLAVIX) 75 MG tablet, Take 75 mg by mouth, Disp: , Rfl:      doxycycline hyclate (VIBRA-TABS) 100 MG tablet, TAKE 1 TABLET BY MOUTH TWICE DAILY FOR 7 DAYS, Disp: , Rfl:      esomeprazole (NEXIUM) 40 MG DR capsule, Take 40 mg by mouth, Disp: , Rfl:      esomeprazole (NEXIUM) 40 MG DR capsule, TAKE 1 CAPSULE BY MOUTH ONCE DAILY BEFORE A MEAL, Disp: , Rfl:      famotidine (PEPCID) 40 MG tablet, Take 40 mg by mouth, Disp: , Rfl:      fexofenadine (ALLEGRA) 180 MG tablet, Take 180 mg by mouth, Disp: , Rfl:      FLUAD QUADRIVALENT 0.5 ML PRSY injection, PHARMACIST ADMINISTERED IMMUNIZATION ADMINISTERED AT TIME OF DISPENSING, Disp: , Rfl:      hydrOXYzine (ATARAX) 10 MG tablet, TAKE 1-2 TABLETS BY MOUTH EVERY 8 HOURS AS NEEDED, Disp: , Rfl:      levothyroxine (SYNTHROID/LEVOTHROID) 50 MCG tablet, TAKE 1 TABLET BY MOUTH DAILY BEFORE BREAKFAST ON AN EMPTY STOMACH FOR THYROID REPLACEMENT, Disp: , Rfl:      metoprolol succinate ER (TOPROL-XL) 25 MG 24 hr tablet, Take 25 mg by mouth, Disp: , Rfl:      nitroGLYcerin (NITROSTAT) 0.4 MG sublingual tablet, Place 1 tablet under the tongue, Disp: , Rfl:      predniSONE (DELTASONE) 10 MG tablet, , Disp: , Rfl:      rosuvastatin (CRESTOR) 10 MG tablet, Take 10 mg by mouth, Disp: , Rfl:      rosuvastatin (CRESTOR) 5 MG tablet, TAKE 1 TABLET BY MOUTH EVERY OTHER DAY IN THE EVENING, Disp: , Rfl:      telmisartan (MICARDIS) 40 MG tablet, Take 40 mg by mouth, Disp: , Rfl:      valACYclovir (VALTREX) 500 MG tablet, TK 4 TS PO AT ONSET OF COLDSORE AND 4 TS 12 HOURS LATER, Disp: , Rfl:      zolpidem (AMBIEN) 10 MG tablet, TAKE 1/2 TABLET BY MOUTH AT BEDTIME AS NEEDED FOR SLEEP, Disp: , Rfl:     Allergies -   Allergies   Allergen Reactions     Prednisone Anaphylaxis, Anxiety and Other (See Comments)     Atorvastatin Nausea and Vomiting and Other (See Comments)     Carbamazepine Unknown and Other  (See Comments)     Sore throat and tongue felt like boiling water poured over it  Sore throat and tongue felt like boiling water poured over it       Chlorpheniramine & Phenylpropanolamine Injection  [A.R.M.]      Dizzy, nausea     Codeine Nausea and Vomiting and Other (See Comments)     Contrast Dye Unknown     Contraindicated d/t advanced kidney diesease     Duloxetine Dizziness and Other (See Comments)     Furosemide Itching     Gabapentin Swelling and Other (See Comments)     Hydrocodone-Acetaminophen Nausea and Vomiting and Other (See Comments)     Ibrutinib Nausea     Ibuprofen Unknown and Other (See Comments)     Losartan Other (See Comments)     Meperidine Nausea and Vomiting and Other (See Comments)     Neomycin-Polymyxin-Dexameth Itching     Landen/Poly/Dex 0.1% opth. Ointment  Extreme itching inside eye     Simvastatin Muscle Pain (Myalgia) and Other (See Comments)     Sulfa Drugs Other (See Comments)     Tramadol Unknown and Other (See Comments)       Social History -   Social History     Socioeconomic History     Marital status: Single     Spouse name: Not on file     Number of children: Not on file     Years of education: Not on file     Highest education level: Not on file   Occupational History     Not on file   Social Needs     Financial resource strain: Not on file     Food insecurity:     Worry: Not on file     Inability: Not on file     Transportation needs:     Medical: Not on file     Non-medical: Not on file   Tobacco Use     Smoking status: Not on file   Substance and Sexual Activity     Alcohol use: Not on file     Drug use: Not on file     Sexual activity: Not on file   Lifestyle     Physical activity:     Days per week: Not on file     Minutes per session: Not on file     Stress: Not on file   Relationships     Social connections:     Talks on phone: Not on file     Gets together: Not on file     Attends Sabianist service: Not on file     Active member of club or organization: Not on file      Attends meetings of clubs or organizations: Not on file     Relationship status: Not on file     Intimate partner violence:     Fear of current or ex partner: Not on file     Emotionally abused: Not on file     Physically abused: Not on file     Forced sexual activity: Not on file   Other Topics Concern     Not on file   Social History Narrative     Not on file       Family History - No family history on file.    Review of Systems - As per HPI and PMHx, otherwise 10+ system review of the head and neck, and general constitution is negative.    Physical Exam  /67   Pulse 72   Resp 16   Wt 89.4 kg (197 lb)   SpO2 96%       General - The patient is well nourished and well developed, and appears to have good nutritional status.  Alert and oriented to person and place, answers questions and cooperates with examination appropriately.   Head and Face - Normocephalic and atraumatic, with no gross asymmetry noted of the contour of the facial features.  The facial nerve is intact, with strong symmetric movements.  Voice and Breathing - The patient was breathing comfortably without the use of accessory muscles. There was no wheezing, stridor, or stertor.  The patients voice was clear and strong, and had appropriate pitch and quality.  Ears - The tympanic membranes are normal in appearance, bony landmarks are intact.  No retraction, perforation, or masses.  No fluid or purulence was seen in the external canal or the middle ear. No evidence of infection of the middle ear or external canal, cerumen was normal in appearance.        A/P - Rupal Luu is a 79 year old female  (H90.5) SNHL (sensory-neural hearing loss), asymmetrical  (primary encounter diagnosis)  (R42) Dizziness  (H93.11) Tinnitus, right    I am going to hold on the transtympanic injections.    I am going to treat her bad taste in the mouth with a week of Nystatin.    For her tinnitus I think it's time to refer to Tinnitus program.  I discussed this with  her, but because she has so much going on with her esophagus and GI, and Cardiology, she does not want to start another course of medical treatment.  Therefore, I will try to help her with a very low dose at bedtime Elavil.        Again, thank you for allowing me to participate in the care of your patient.        Sincerely,        Frandy Hoff MD

## 2020-11-23 NOTE — TELEPHONE ENCOUNTER
Called pharmacy to check specific interaction between medications/allergy. Potential cross reactivity as far as allergic reaction. Would MD like to have medication filled or change?    abdominal mass

## 2020-11-23 NOTE — TELEPHONE ENCOUNTER
Reason for Call:  Other prescription    Detailed comments: Patient has an allergy to carbamazepine and Pharmacy states that amitriptyline (ELAVIL) 10 MG tablet has a  possible reaction. Please call back to discuss.          Phone Number Patient can be reached at: Other phone number:  na    Best Time: any    Can we leave a detailed message on this number? YES    Call taken on 11/23/2020 at 1:30 PM by Mar Mark

## 2021-03-14 ENCOUNTER — HEALTH MAINTENANCE LETTER (OUTPATIENT)
Age: 81
End: 2021-03-14

## 2021-07-04 ENCOUNTER — HEALTH MAINTENANCE LETTER (OUTPATIENT)
Age: 81
End: 2021-07-04

## 2021-10-24 ENCOUNTER — HEALTH MAINTENANCE LETTER (OUTPATIENT)
Age: 81
End: 2021-10-24

## 2022-01-01 NOTE — PROGRESS NOTES
AUDIOLOGY REPORT:    Patient was referred from ENT by Efrain Hoff MD for audiology evaluation. Patient complains of dizziness and right  Sided tinnitus. She was last evaluated in  This clinic on 11/4/2019.    Testing:    Otoscopy:   Otoscopic exam indicates ears are clear of cerumen bilaterally     Tympanograms:    RIGHT: normal eardrum mobility     LEFT:   normal eardrum mobility    Reflexes (reported by stimulus ear):  RIGHT: Ipsilateral is absent at frequencies tested  RIGHT: Contralateral is absent at frequencies tested  LEFT:   Ipsilateral is absent at frequencies tested  LEFT:   Contralateral is absent at frequencies tested    Thresholds:   Pure Tone Thresholds assessed using conventional audiometry with good reliability from 250-8000 Hz bilaterally using insert earphones      RIGHT:  mild-moderate from 250-4000 Hz sloping to severe sensorineural hearing loss from 1024-0615 Hz    LEFT:    mild-moderate from 250-4000 Hz sloping to severe sensorineural hearing loss from 4067-2979 Hz    Speech Reception Threshold:    RIGHT: 50 dB HL    LEFT:   50 dB HL  Speech Reception Thresholds are in good agreement with pure tone thresholds.    Word Recognition Score:     RIGHT: 92% at 90 dB HL using NU-6 recorded word list.    LEFT:   88% at 90 dB HL using NU-6 recorded word list.    Discussed results with the patient. Compared with audiogram of 11/4/19, right ear hearing has improved and there is no significant asymmetry.    Patient was returned to ENT for follow up.     Jair Rodriguez MA, CCC-A  Licensed Audiologist #[id number]  10/19/2020         PAST MEDICAL HISTORY:  No pertinent past medical history

## 2022-02-13 ENCOUNTER — HEALTH MAINTENANCE LETTER (OUTPATIENT)
Age: 82
End: 2022-02-13

## 2022-04-10 ENCOUNTER — HEALTH MAINTENANCE LETTER (OUTPATIENT)
Age: 82
End: 2022-04-10

## 2022-06-05 ENCOUNTER — HEALTH MAINTENANCE LETTER (OUTPATIENT)
Age: 82
End: 2022-06-05

## 2022-10-15 ENCOUNTER — HEALTH MAINTENANCE LETTER (OUTPATIENT)
Age: 82
End: 2022-10-15

## 2023-03-26 ENCOUNTER — HEALTH MAINTENANCE LETTER (OUTPATIENT)
Age: 83
End: 2023-03-26

## 2023-05-27 ENCOUNTER — HEALTH MAINTENANCE LETTER (OUTPATIENT)
Age: 83
End: 2023-05-27

## 2023-08-20 ENCOUNTER — HEALTH MAINTENANCE LETTER (OUTPATIENT)
Age: 83
End: 2023-08-20

## 2024-01-07 ENCOUNTER — HEALTH MAINTENANCE LETTER (OUTPATIENT)
Age: 84
End: 2024-01-07

## 2025-08-14 ENCOUNTER — APPOINTMENT (OUTPATIENT)
Dept: URBAN - METROPOLITAN AREA CLINIC 252 | Age: 85
Setting detail: DERMATOLOGY
End: 2025-08-15

## 2025-08-14 VITALS — WEIGHT: 180 LBS | HEIGHT: 61 IN

## 2025-08-14 DIAGNOSIS — L72.8 OTHER FOLLICULAR CYSTS OF THE SKIN AND SUBCUTANEOUS TISSUE: ICD-10-CM

## 2025-08-14 DIAGNOSIS — L82.1 OTHER SEBORRHEIC KERATOSIS: ICD-10-CM

## 2025-08-14 DIAGNOSIS — L50.1 IDIOPATHIC URTICARIA: ICD-10-CM

## 2025-08-14 DIAGNOSIS — L57.0 ACTINIC KERATOSIS: ICD-10-CM

## 2025-08-14 PROBLEM — L30.9 DERMATITIS, UNSPECIFIED: Status: ACTIVE | Noted: 2025-08-14

## 2025-08-14 PROCEDURE — OTHER PRESCRIPTION: OTHER

## 2025-08-14 PROCEDURE — 99204 OFFICE O/P NEW MOD 45 MIN: CPT | Mod: 25

## 2025-08-14 PROCEDURE — OTHER PRESCRIPTION MEDICATION MANAGEMENT: OTHER

## 2025-08-14 PROCEDURE — OTHER COUNSELING: OTHER

## 2025-08-14 PROCEDURE — 17000 DESTRUCT PREMALG LESION: CPT

## 2025-08-14 PROCEDURE — OTHER LIQUID NITROGEN: OTHER

## 2025-08-14 RX ORDER — CETIRIZINE HYDROCHLORIDE 10 MG/1
TABLET, FILM COATED ORAL DAILY
Qty: 90 | Refills: 4 | Status: ERX | COMMUNITY
Start: 2025-08-14

## 2025-08-14 ASSESSMENT — LOCATION SIMPLE DESCRIPTION DERM
LOCATION SIMPLE: RIGHT NOSE
LOCATION SIMPLE: LEFT NOSE
LOCATION SIMPLE: HAIR
LOCATION SIMPLE: CHEST
LOCATION SIMPLE: RIGHT CHEEK

## 2025-08-14 ASSESSMENT — LOCATION ZONE DERM
LOCATION ZONE: SCALP
LOCATION ZONE: TRUNK
LOCATION ZONE: FACE
LOCATION ZONE: NOSE

## 2025-08-14 ASSESSMENT — LOCATION DETAILED DESCRIPTION DERM
LOCATION DETAILED: HAIR
LOCATION DETAILED: RIGHT NASAL ALA
LOCATION DETAILED: STERNAL NOTCH
LOCATION DETAILED: LEFT NASAL SIDEWALL
LOCATION DETAILED: RIGHT INFERIOR CENTRAL MALAR CHEEK